# Patient Record
Sex: MALE | Race: WHITE | NOT HISPANIC OR LATINO | URBAN - METROPOLITAN AREA
[De-identification: names, ages, dates, MRNs, and addresses within clinical notes are randomized per-mention and may not be internally consistent; named-entity substitution may affect disease eponyms.]

---

## 2024-03-19 ENCOUNTER — OFFICE VISIT (OUTPATIENT)
Dept: PULMONOLOGY | Facility: CLINIC | Age: 69
End: 2024-03-19
Payer: MEDICARE

## 2024-03-19 VITALS
HEIGHT: 70 IN | OXYGEN SATURATION: 97 % | HEART RATE: 62 BPM | SYSTOLIC BLOOD PRESSURE: 149 MMHG | BODY MASS INDEX: 28.41 KG/M2 | DIASTOLIC BLOOD PRESSURE: 82 MMHG | WEIGHT: 198.44 LBS

## 2024-03-19 DIAGNOSIS — F17.210 CIGARETTE SMOKER: ICD-10-CM

## 2024-03-19 DIAGNOSIS — J84.115 RESPIRATORY BRONCHIOLITIS ASSOCIATED INTERSTITIAL LUNG DISEASE: ICD-10-CM

## 2024-03-19 DIAGNOSIS — J21.9 BRONCHIOLITIS: Primary | ICD-10-CM

## 2024-03-19 PROCEDURE — 1126F AMNT PAIN NOTED NONE PRSNT: CPT | Mod: CPTII,S$GLB,, | Performed by: INTERNAL MEDICINE

## 2024-03-19 PROCEDURE — 3077F SYST BP >= 140 MM HG: CPT | Mod: CPTII,S$GLB,, | Performed by: INTERNAL MEDICINE

## 2024-03-19 PROCEDURE — 3008F BODY MASS INDEX DOCD: CPT | Mod: CPTII,S$GLB,, | Performed by: INTERNAL MEDICINE

## 2024-03-19 PROCEDURE — 1159F MED LIST DOCD IN RCRD: CPT | Mod: CPTII,S$GLB,, | Performed by: INTERNAL MEDICINE

## 2024-03-19 PROCEDURE — 3079F DIAST BP 80-89 MM HG: CPT | Mod: CPTII,S$GLB,, | Performed by: INTERNAL MEDICINE

## 2024-03-19 PROCEDURE — 99214 OFFICE O/P EST MOD 30 MIN: CPT | Mod: S$GLB,,, | Performed by: INTERNAL MEDICINE

## 2024-03-19 PROCEDURE — 99999 PR PBB SHADOW E&M-NEW PATIENT-LVL III: CPT | Mod: PBBFAC,,, | Performed by: INTERNAL MEDICINE

## 2024-03-19 RX ORDER — TAMSULOSIN HYDROCHLORIDE 0.4 MG/1
0.4 CAPSULE ORAL
COMMUNITY
Start: 2024-03-11

## 2024-03-19 RX ORDER — NATEGLINIDE 120 MG/1
TABLET ORAL
COMMUNITY
Start: 2023-12-11

## 2024-03-19 RX ORDER — SITAGLIPTIN AND METFORMIN HYDROCHLORIDE 1000; 100 MG/1; MG/1
1 TABLET, FILM COATED, EXTENDED RELEASE ORAL NIGHTLY
COMMUNITY

## 2024-03-19 RX ORDER — BENAZEPRIL HYDROCHLORIDE 10 MG/1
10 TABLET ORAL
COMMUNITY
Start: 2023-10-12

## 2024-03-19 RX ORDER — BUMETANIDE 1 MG/1
1 TABLET ORAL DAILY
COMMUNITY

## 2024-03-19 RX ORDER — LEVOFLOXACIN 500 MG/1
TABLET, FILM COATED ORAL
COMMUNITY
Start: 2024-03-12

## 2024-03-19 RX ORDER — ESCITALOPRAM OXALATE 10 MG/1
10 TABLET ORAL
COMMUNITY
Start: 2024-01-22

## 2024-03-19 RX ORDER — AZITHROMYCIN 250 MG/1
TABLET, FILM COATED ORAL
Qty: 15 TABLET | Refills: 0 | Status: SHIPPED | OUTPATIENT
Start: 2024-03-19 | End: 2024-04-02

## 2024-03-19 RX ORDER — LORAZEPAM 1 MG/1
1 TABLET ORAL 2 TIMES DAILY
COMMUNITY
Start: 2024-02-15

## 2024-03-19 RX ORDER — METFORMIN HYDROCHLORIDE 500 MG/1
500 TABLET, EXTENDED RELEASE ORAL 2 TIMES DAILY
COMMUNITY
Start: 2024-03-11

## 2024-03-19 RX ORDER — ASPIRIN 81 MG/1
1 TABLET ORAL DAILY
COMMUNITY

## 2024-03-19 RX ORDER — PREDNISONE 10 MG/1
10 TABLET ORAL
COMMUNITY
Start: 2024-03-12

## 2024-03-19 RX ORDER — CALCIUM CITRATE/VITAMIN D3 200MG-6.25
TABLET ORAL
COMMUNITY

## 2024-03-19 RX ORDER — METOPROLOL SUCCINATE 25 MG/1
TABLET, EXTENDED RELEASE ORAL
COMMUNITY
Start: 2024-02-24

## 2024-03-19 RX ORDER — SIMVASTATIN 80 MG/1
80 TABLET, FILM COATED ORAL
COMMUNITY
Start: 2023-11-30

## 2024-03-19 RX ORDER — ISOSORBIDE MONONITRATE 60 MG/1
60 TABLET, EXTENDED RELEASE ORAL
COMMUNITY
Start: 2023-11-01

## 2024-03-19 RX ORDER — ALBUTEROL SULFATE 90 UG/1
2 AEROSOL, METERED RESPIRATORY (INHALATION) EVERY 4 HOURS PRN
COMMUNITY

## 2024-03-19 RX ORDER — AMLODIPINE BESYLATE 10 MG/1
TABLET ORAL
COMMUNITY
Start: 2023-11-01

## 2024-03-19 RX ORDER — BUDESONIDE 0.5 MG/2ML
0.5 INHALANT ORAL 2 TIMES DAILY
Qty: 120 ML | Refills: 0 | Status: SHIPPED | OUTPATIENT
Start: 2024-03-19

## 2024-03-19 RX ORDER — EMPAGLIFLOZIN 25 MG/1
25 TABLET, FILM COATED ORAL EVERY MORNING
COMMUNITY

## 2024-03-19 NOTE — ASSESSMENT & PLAN NOTE
Discussed immediate cessation of menthol. Discussed critical importance of lung health to complete smoking cessation.

## 2024-03-19 NOTE — PROGRESS NOTES
"Subjective:       Patient ID: Joyce Durant is a 68 y.o. male.  The patient's last visit with me was on Visit date not found.     Last seen at  1/8/24    Since that visit, chest congestion has worsened and noting episodes of SOB with severe non-productive cough. He is still smoking 1ppd menthol cigarettes. No f/c/ns. No improvement with Steroid shot, 20mg Pred and levaquin x 1 week. No improvement with Trelegy.  Review of Systems    Objective:      Vitals:    03/19/24 1009   BP: (!) 149/82   Pulse: 62     Wt Readings from Last 3 Encounters:   03/19/24 90 kg (198 lb 6.6 oz)     Temp Readings from Last 3 Encounters:   No data found for Temp     BP Readings from Last 3 Encounters:   03/19/24 (!) 149/82     Pulse Readings from Last 3 Encounters:   03/19/24 62       Physical Exam   Constitutional: He is oriented to person, place, and time. He appears well-developed and well-nourished.   HENT:   Head: Normocephalic.   Mouth/Throat: Oropharynx is clear and moist.   Cardiovascular: Normal rate and regular rhythm.   Pulmonary/Chest: Normal expansion, symmetric chest wall expansion and effort normal. He has wheezes (and squeakd). He has rhonchi.   Abdominal: Soft. He exhibits no distension.   Musculoskeletal:         General: No edema. Normal range of motion.   Lymphadenopathy: No supraclavicular adenopathy is present.     He has no cervical adenopathy.   Neurological: He is alert and oriented to person, place, and time. Gait normal.   Skin: Skin is warm and dry. No rash noted.   Psychiatric: He has a normal mood and affect. His behavior is normal. Thought content normal.   Vitals reviewed.    CBC  No results found for: "WBC", "HGB", "HCT", "MCV", "PLT"      CMP  No results found for: "NA", "K", "CL", "CO2", "GLU", "BUN", "CREATININE", "CALCIUM", "PROT", "ALBUMIN", "BILITOT", "ALKPHOS", "AST", "ALT", "ANIONGAP", "EGFRNORACEVR"    ABG  pH   Date Value Ref Range Status   10/10/2022 7.0 5.0 - 8.0 Final " "          Personal Diagnostic Review  I have personally reviewed the following data and added my own interpretation as below:  Chest x-ray: DIS March 2024- stable mild ILD      3/19/2024    10:09 AM   Pulmonary Function Tests   SpO2 97 %   Height 5' 10" (1.778 m)   Weight 90 kg (198 lb 6.6 oz)   BMI (Calculated) 28.5         Assessment:       1. Bronchiolitis    2. Respiratory bronchiolitis associated interstitial lung disease    3. Cigarette smoker        Outpatient Encounter Medications as of 3/19/2024   Medication Sig Dispense Refill    albuterol (PROVENTIL/VENTOLIN HFA) 90 mcg/actuation inhaler Inhale 2 puffs into the lungs every 4 (four) hours as needed for Wheezing.      amLODIPine (NORVASC) 10 MG tablet       aspirin (ECOTRIN) 81 MG EC tablet Take 1 tablet by mouth once daily.      benazepriL (LOTENSIN) 10 MG tablet 10 mg.      bumetanide (BUMEX) 1 MG tablet Take 1 mg by mouth once daily.      EScitalopram oxalate (LEXAPRO) 10 MG tablet 10 mg.      isosorbide mononitrate (IMDUR) 60 MG 24 hr tablet 60 mg.      JANUMET -1,000 mg TM24 Take 1 tablet by mouth every evening.      JARDIANCE 25 mg tablet Take 25 mg by mouth every morning.      levoFLOXacin (LEVAQUIN) 500 MG tablet TAKE 1 TABLET BY MOUTH ONCE DAILY FOR INFECTION      LORazepam (ATIVAN) 1 MG tablet Take 1 mg by mouth 2 (two) times daily.      metFORMIN (GLUCOPHAGE-XR) 500 MG ER 24hr tablet Take 500 mg by mouth 2 (two) times daily.      metoprolol succinate (TOPROL-XL) 25 MG 24 hr tablet       nateglinide (STARLIX) 120 MG tablet       predniSONE (DELTASONE) 10 MG tablet 10 mg.      simvastatin (ZOCOR) 80 MG tablet 80 mg.      tamsulosin (FLOMAX) 0.4 mg Cap 0.4 mg.      TRUE METRIX GLUCOSE TEST STRIP Strp USE TO TEST BLOOD SUGAR THREE TIMES DAILY AS DIRECTED      azithromycin (Z-BEAR) 250 MG tablet Take 2 tablets (500 mg total) by mouth once daily for 1 day, THEN 1 tablet (250 mg total) once daily for 13 days. 15 tablet 0    " budesonide (PULMICORT) 0.5 mg/2 mL nebulizer solution Take 2 mLs (0.5 mg total) by nebulization 2 (two) times a day. Controller 120 mL 0     No facility-administered encounter medications on file as of 3/19/2024.     1. Bronchiolitis  - azithromycin (Z-BEAR) 250 MG tablet; Take 2 tablets (500 mg total) by mouth once daily for 1 day, THEN 1 tablet (250 mg total) once daily for 13 days.  Dispense: 15 tablet; Refill: 0  - NEBULIZER FOR HOME USE    2. Respiratory bronchiolitis associated interstitial lung disease  - budesonide (PULMICORT) 0.5 mg/2 mL nebulizer solution; Take 2 mLs (0.5 mg total) by nebulization 2 (two) times a day. Controller  Dispense: 120 mL; Refill: 0  - azithromycin (Z-BEAR) 250 MG tablet; Take 2 tablets (500 mg total) by mouth once daily for 1 day, THEN 1 tablet (250 mg total) once daily for 13 days.  Dispense: 15 tablet; Refill: 0  - NEBULIZER FOR HOME USE  - Complete PFT with bronchodilator; Future  - PULSE OXIMETRY WITH REST - PULM; Future    3. Cigarette smoker    Plan:     Problem List Items Addressed This Visit          Pulmonary    Respiratory bronchiolitis associated interstitial lung disease    Current Assessment & Plan     Distribution not suspicious for IPF. PFTS nl over 1 year.   - acute increased symptoms in setting of smoking menthol very suspicious for SR-ILD, particularly RB-ILD.  -repeat PFTs in 3 months given increased symptoms.  Next imaging due 12/24 but may get early if symptoms do not clear         Relevant Medications    budesonide (PULMICORT) 0.5 mg/2 mL nebulizer solution    azithromycin (Z-BEAR) 250 MG tablet    Other Relevant Orders    NEBULIZER FOR HOME USE    Complete PFT with bronchodilator    PULSE OXIMETRY WITH REST - PULM    Bronchiolitis - Primary    Current Assessment & Plan     Acute flair. No response to IV and PO steroids. Will add budesonide nebs and switch levaquin to azithro.         Relevant Medications    azithromycin (Z-BEAR) 250 MG tablet    Other Relevant  Orders    NEBULIZER FOR HOME USE       Other    Cigarette smoker    Current Assessment & Plan     Discussed immediate cessation of menthol. Discussed critical importance of lung health to complete smoking cessation.              No follow-ups on file.    No future appointments.      Rajendra Franco MD

## 2024-03-19 NOTE — ASSESSMENT & PLAN NOTE
Acute flair. No response to IV and PO steroids. Will add budesonide nebs and switch levaquin to azithro.

## 2024-03-19 NOTE — ASSESSMENT & PLAN NOTE
Distribution not suspicious for IPF. PFTS nl over 1 year.   - acute increased symptoms in setting of smoking menthol very suspicious for SR-ILD, particularly RB-ILD.  -repeat PFTs in 3 months given increased symptoms.  Next imaging due 12/24 but may get early if symptoms do not clear

## 2024-06-17 ENCOUNTER — TELEPHONE (OUTPATIENT)
Dept: PULMONOLOGY | Facility: CLINIC | Age: 69
End: 2024-06-17
Payer: MEDICARE

## 2024-06-17 NOTE — TELEPHONE ENCOUNTER
----- Message from Katarina Hargrove sent at 6/17/2024  3:30 PM CDT -----  Type:  Appointment Request    Name of Caller:pts wife   When is the first available appointment?No access  Symptoms:PFT  Would the patient rather a call back or a response via [facility]? Call back   Best Call Back Number: [phone]  Additional Information: Patients wife states she would like to reschedule the patients PFT. Patients wife states they had to cancel the appointment but needs to reschedule for a Monday or Thursday appointment. Patients wife states she would like a call back with further assistance and more information.

## 2024-06-24 PROBLEM — J84.115 RESPIRATORY BRONCHIOLITIS ASSOCIATED INTERSTITIAL LUNG DISEASE: Status: RESOLVED | Noted: 2024-03-19 | Resolved: 2024-06-24

## 2024-10-24 ENCOUNTER — PATIENT MESSAGE (OUTPATIENT)
Dept: RESEARCH | Facility: HOSPITAL | Age: 69
End: 2024-10-24
Payer: MEDICARE